# Patient Record
Sex: MALE | Race: BLACK OR AFRICAN AMERICAN | NOT HISPANIC OR LATINO | URBAN - METROPOLITAN AREA
[De-identification: names, ages, dates, MRNs, and addresses within clinical notes are randomized per-mention and may not be internally consistent; named-entity substitution may affect disease eponyms.]

---

## 2022-07-29 ENCOUNTER — EMERGENCY (EMERGENCY)
Facility: HOSPITAL | Age: 27
LOS: 1 days | Discharge: HOME | End: 2022-07-29
Attending: EMERGENCY MEDICINE | Admitting: EMERGENCY MEDICINE
Payer: COMMERCIAL

## 2022-07-29 VITALS
DIASTOLIC BLOOD PRESSURE: 83 MMHG | RESPIRATION RATE: 16 BRPM | OXYGEN SATURATION: 96 % | TEMPERATURE: 98 F | HEIGHT: 64 IN | SYSTOLIC BLOOD PRESSURE: 131 MMHG | WEIGHT: 160.06 LBS | HEART RATE: 84 BPM

## 2022-07-29 VITALS
RESPIRATION RATE: 19 BRPM | HEART RATE: 70 BPM | OXYGEN SATURATION: 99 % | TEMPERATURE: 98 F | SYSTOLIC BLOOD PRESSURE: 118 MMHG | DIASTOLIC BLOOD PRESSURE: 81 MMHG
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
## 2022-07-29 DIAGNOSIS — Z91.010 ALLERGY TO PEANUTS: ICD-10-CM

## 2022-07-29 DIAGNOSIS — T78.2XXA ANAPHYLACTIC SHOCK, UNSPECIFIED, INITIAL ENCOUNTER: ICD-10-CM
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
## 2022-07-29 DIAGNOSIS — J45.909 UNSPECIFIED ASTHMA, UNCOMPLICATED: ICD-10-CM

## 2022-07-29 DIAGNOSIS — R07.0 PAIN IN THROAT: ICD-10-CM

## 2022-07-29 PROCEDURE — 99285 EMERGENCY DEPT VISIT HI MDM: CPT | Mod: 25

## 2022-07-29 PROCEDURE — 96372 THER/PROPH/DIAG INJ SC/IM: CPT

## 2022-07-29 PROCEDURE — 99284 EMERGENCY DEPT VISIT MOD MDM: CPT

## 2022-07-29 RX ORDER — EPINEPHRINE 0.3 MG/.3ML
0.3 INJECTION INTRAMUSCULAR; SUBCUTANEOUS ONCE
Refills: 0 | Status: COMPLETED | OUTPATIENT
Start: 2022-07-29 | End: 2022-07-29

## 2022-07-29 RX ORDER — DIPHENHYDRAMINE HCL 50 MG
1 CAPSULE ORAL
Qty: 15 | Refills: 0
Start: 2022-07-29

## 2022-07-29 RX ORDER — FAMOTIDINE 10 MG/ML
1 INJECTION INTRAVENOUS
Qty: 4 | Refills: 0
Start: 2022-07-29 | End: 2022-08-01

## 2022-07-29 RX ORDER — EPINEPHRINE 0.3 MG/.3ML
0.3 INJECTION INTRAMUSCULAR; SUBCUTANEOUS
Qty: 2 | Refills: 0
Start: 2022-07-29

## 2022-07-29 RX ADMIN — EPINEPHRINE 0.3 MILLIGRAM(S): 0.3 INJECTION INTRAMUSCULAR; SUBCUTANEOUS at 11:30

## 2022-07-29 NOTE — ED PROVIDER NOTE - NSFOLLOWUPINSTRUCTIONS_ED_ALL_ED_FT
Can take benadryl 25-50mg every 6hrs as needed for rash/itching - MAY CAUSE LIGHTHEADEDNESS.  Can take tylenol 650mg every 6hrs as needed for pain.  Take steroids as prescribed.   Inject epi-pen as prescribed for worsening breathing AND/OR facial swelling or worsening voice changes. AND RETURN to ER.  Return to ER for persistent fever/vomit, uncontrolled pain, worsening breathing.   Follow up with primary doctor within 1-2 days.  Follow up with allergist.     Allergic Reaction    An allergic reaction is an abnormal reaction to a substance (allergen) by the body's defense system. Common allergens include medicines, food, insect bites or stings, and blood products. The body releases certain proteins into the blood that can cause a variety of symptoms such as an itchy rash, wheezing, swelling of the face/lips/tongue/throat, abdominal pain, nausea or vomiting. An allergic reaction is usually treated with medication. If your health care provider prescribed you an epinephrine injection device, make sure to keep it with you at all times.    SEEK IMMEDIATE MEDICAL CARE IF YOU HAVE ANY OF THE FOLLOWING SYMPTOMS: allergic reaction severe enough that required you to use epinephrine, tightness in your chest, swelling around your lips/tongue/throat, abdominal pain, vomiting or diarrhea, or lightheadedness/dizziness. These symptoms may represent a serious problem that is an emergency. Do not wait to see if the symptoms will go away. Use your auto-injector pen or anaphylaxis kit as you have been instructed. Call 911 and do not drive yourself to the hospital.

## 2022-07-29 NOTE — ED ADULT TRIAGE NOTE - CHIEF COMPLAINT QUOTE
Pt brought in by EMS from urgent care for possible allergic reaction. Pt presented with drooling, tongue swelling, and throat swelling starting ~0200 this morning. Pt was given .3mg epinephrine, 20mg Pepcid, 10mg Dexamethasone, and 50mg Benadryl at urgent care. Pt reports some relief of symptoms but continues to have garbled speech, drooling, and throat pain.

## 2022-07-29 NOTE — ED ADULT NURSE NOTE - OBJECTIVE STATEMENT
25 y/o male w/ hx of peanut allergy presents to the ED from  via EMS for possible allergic reaction that began last night around 2 AM after eating vegan bowl. Pt c/o drooling, tongue swelling, and throat swelling. Pt given Epi .3 mg, Pepcid 20 mg, Dexamethasone 10 mg, and Benadryl 50 mg at . Pt reports mild relief of sx. Denies any other sx.

## 2022-07-29 NOTE — ED ADULT NURSE REASSESSMENT NOTE - NS ED NURSE REASSESS COMMENT FT1
Pt reports feeling better. Pt able to speak in full sentences without respiratory distress. Will continue to monitor.

## 2022-07-29 NOTE — ED PROVIDER NOTE - OBJECTIVE STATEMENT
25 y/o M, PMHx of asthma, BIBA from urgent care for allergic reaction. Pt states    he was eating a vegan bowl with peanuts. Around 200AM, pt developed swelling of throat and tongue. Pt also reported drooling. Pt went to  where he was given    given .3mg epinephrine, 20mg Pepcid, 10mg Dexamethasone, and 50mg Benadryl. Pt reports some relief of sxs, but continues to have garbled speech, drooling, and throat pain. Pt denies SOB, abdominal pain, vomiting, and diarrhea. Pt has no other complaints. 25 y/o M, PMHx of asthma, BIBA from urgent care for allergic reaction. Pt states    he was eating a vegan bowl with peanuts. Around 200AM, pt developed swelling of throat and tongue. Pt also reported drooling. Pt went to  where he was    given .3mg epinephrine, 20mg Pepcid, 10mg Dexamethasone, and 50mg Benadryl. Pt reports some relief of sxs, but continues to have garbled speech,  and throat pain. Pt denies SOB, abdominal pain, vomiting, and diarrhea. Pt has no other complaints. No other allergies except peanuts.

## 2022-07-29 NOTE — ED PROVIDER NOTE - PHYSICAL EXAMINATION
CONSTITUTIONAL: Awake, alert and in no apparent distress.  HEENT: Head is atraumatic. Eyes clear bilaterally, normal EOMI. Airway patent. mild watery uvula, no obvious swelling to OP  CARDIAC: Normal rate, regular rhythm.  Heart sounds S1, S2.   RESPIRATORY: Breath sounds clear and equal bilaterally. no tachypnea, respiratory distress.   GASTROINTESTINAL: Abdomen soft, non-tender, no guarding, distension.  MUSCULOSKELETAL: Spine appears normal, no midline spinal tenderness, range of motion is not limited, no muscle or joint tenderness. no bony tenderness.   NEUROLOGICAL: Alert, no focal deficits, no motor or sensory deficits.  SKIN: Skin normal color for race, warm, dry and intact. No evidence of rash.  PSYCHIATRIC: Normal mood and affect. no apparent risk to self or others.

## 2022-07-29 NOTE — ED PROVIDER NOTE - PROGRESS NOTE DETAILS
reassessed pt NAD, states feeling better in terms of OP complaints. pt improved, Nad, states feeling better states feel back to normal, prescribed meds to pharmacy, strict return prec given.

## 2022-07-29 NOTE — ED PROVIDER NOTE - CLINICAL SUMMARY MEDICAL DECISION MAKING FREE TEXT BOX
Quality 130: Documentation Of Current Medications In The Medical Record: Current Medications Documented Quality 431: Preventive Care And Screening: Unhealthy Alcohol Use - Screening: Patient screened for unhealthy alcohol use using a single question and scores less than 2 times per year Detail Level: Zone Quality 226: Preventive Care And Screening: Tobacco Use: Screening And Cessation Intervention: Patient screened for tobacco use and is an ex/non-smoker pt w anaphylaxis, w subjective complaints of throat swelling, garbled speech, given 2nd dose of epi w improvement, will continue to observe.

## 2022-07-29 NOTE — ED PROVIDER NOTE - PATIENT PORTAL LINK FT
You can access the FollowMyHealth Patient Portal offered by [facility] by registering at the following website: [email or web address]. By joining [employer]s FollowMyHealth portal, you will also be able to view your health information using other applications (apps) compatible with our system.
